# Patient Record
(demographics unavailable — no encounter records)

---

## 2024-10-07 NOTE — ASSESSMENT
[FreeTextEntry1] : Impression: Fracture left distal radius.  She will continue full-time use of the splint for 2 more weeks following that she will be allowed to return to gym and sports 10 days thereafter return here on a as needed basis

## 2024-10-07 NOTE — PHYSICAL EXAM
[FreeTextEntry1] : On exam she has good motion to the wrist in all planes very mild discomfort on deep palpation of the distal radius minimal swelling at this time.  X-rays ordered and taken today satisfactory alignment and ongoing healing of this distal radius fracture

## 2024-10-07 NOTE — HISTORY OF PRESENT ILLNESS
[FreeTextEntry1] : This 15-year-old returns today for follow-up of her left wrist fracture she is doing very well no significant complaints